# Patient Record
Sex: FEMALE | URBAN - METROPOLITAN AREA
[De-identification: names, ages, dates, MRNs, and addresses within clinical notes are randomized per-mention and may not be internally consistent; named-entity substitution may affect disease eponyms.]

---

## 2022-04-21 NOTE — PATIENT INSTRUCTIONS
Start Lexapro with a half a tablet for the first 6 days. Then go to 1 tablet daily. Xanax 3 times daily as needed. Take 1 especially before bed to help with sleep. If feels that is making too drowsy, take a half a tablet. Follow up in a few weeks - sooner if needed.

## 2022-04-21 NOTE — PROGRESS NOTES
HPI:    Patient ID: Christiano Gilbert is a 46year old female. HPI     Video visit done with patient to establish care. She is currently having problems due to witnessing the death of her boyfriend on Easter. She states that she is unable to sleep. Is having a hard time functioning. Denies being suicidal or homicidal.   States in general she has been basically healthy. Does not feel like she is really had doctors appointments since the birth of her daughter about 17 years ago. Currently she is not on any medications. She has had some antibiotics over the last several years due to kidney infections. Denies any chance of being pregnant. States she did start her menses today and he has a history of a bilateral tubal ligation. States she has been on Lexapro in the past.  She did well on the 10 mg, but felt more agitation when she went up to the 20 mg. Discussed medications with patient. She is willing to restart Lexapro at 5 mg for 6 days then go up to the 10 mg. She will also try Xanax to take especially 1 before bedtime. Her sister was present during the video visit. Sister did also clarify information. Review of Systems   Constitutional: Negative. HENT: Negative. Eyes: Negative. Respiratory: Negative. Cardiovascular: Negative. Genitourinary: Negative. Musculoskeletal: Negative. Skin: Negative. Neurological: Negative. Psychiatric/Behavioral:        See HPI            No current outpatient medications on file. Allergies: Ancef [Cefazolin]       HIVES   PHYSICAL EXAM:   There were no vitals filed for this visit. There is no height or weight on file to calculate BMI. Physical Exam  Constitutional:       Appearance: Normal appearance. Comments: Tearful during the visit. HENT:      Head: Normocephalic and atraumatic. Pulmonary:      Effort: Pulmonary effort is normal.   Neurological:      Mental Status: She is alert and oriented to person, place, and time. Psychiatric:         Attention and Perception: Attention and perception normal.         Mood and Affect: Mood is depressed. Speech: Speech normal.         Behavior: Behavior is cooperative. Thought Content: Thought content does not include homicidal or suicidal ideation. Thought content does not include homicidal or suicidal plan. Comments: Crying during the visit                ASSESSMENT/PLAN:   No diagnosis found. No orders of the defined types were placed in this encounter. Meds This Visit:  Requested Prescriptions      No prescriptions requested or ordered in this encounter       Imaging & Referrals:  None      There are no Patient Instructions on file for this visit.        WD#[id number]

## 2022-05-09 ENCOUNTER — TELEPHONE (OUTPATIENT)
Dept: FAMILY MEDICINE CLINIC | Facility: CLINIC | Age: 42
End: 2022-05-09

## 2022-05-11 RX ORDER — ALPRAZOLAM 0.5 MG/1
0.5 TABLET ORAL 3 TIMES DAILY PRN
Qty: 30 TABLET | Refills: 1 | Status: SHIPPED | OUTPATIENT
Start: 2022-05-11

## 2022-05-11 NOTE — TELEPHONE ENCOUNTER
Patient called back for condition update. She was tearful on the phone. She states she took 1/2 tablet of lexapro on the first day it was prescribed. She felt very anxious and did not take it again until 5/9/22. She started having nightmares. Not sleeping well. She asked for a refill of xanax.

## 2022-06-07 NOTE — TELEPHONE ENCOUNTER
Future appt:    Last Appointment with provider:   4/21/22 Telemed  [place] [facility] [place]  Visit date not found  No results found for: CHOLEST, HDL, LDL, TRIGLY, TRIG  No results found for: EAG, A1C  No results found for: T4F, TSH, TSHT4    No follow-ups on file.

## 2022-06-08 RX ORDER — ALPRAZOLAM 0.5 MG/1
TABLET ORAL
Qty: 30 TABLET | Refills: 0 | OUTPATIENT
Start: 2022-06-08

## 2022-06-08 NOTE — TELEPHONE ENCOUNTER
Spoke with pt. She does not need a refill at this time. Will contact office when she is due for refill.    See telephone note 5/9/22- Pt is having decreased nightmares and doing well on the medication

## 2022-07-05 RX ORDER — ALPRAZOLAM 0.5 MG/1
TABLET ORAL
Qty: 30 TABLET | Refills: 1 | Status: SHIPPED | OUTPATIENT
Start: 2022-07-05

## 2022-07-05 NOTE — TELEPHONE ENCOUNTER
Future appt:    Last Appointment with provider:  4/21/22 for anxiety, grieving. Last appointment at [facility] [place]:  Visit date not found  No results found for: CHOLEST, HDL, LDL, TRIGLY, TRIG  No results found for: EAG, A1C  No results found for: T4F, TSH, TSHT4    No follow-ups on file.

## 2022-07-18 NOTE — TELEPHONE ENCOUNTER
Future appt:    Last Appointment with provider:  4/21/22 telmed for grieving, anxiety. [place] [facility] [place]  Visit date not found  No results found for: CHOLEST, HDL, LDL, TRIGLY, TRIG  No results found for: EAG, A1C  No results found for: T4F, TSH, TSHT4    No follow-ups on file.

## 2022-07-20 RX ORDER — ESCITALOPRAM OXALATE 10 MG/1
TABLET ORAL
Qty: 30 TABLET | Refills: 0 | Status: SHIPPED | OUTPATIENT
Start: 2022-07-20

## 2022-07-20 NOTE — TELEPHONE ENCOUNTER
One refill done. Please let patient know that she should have a follow up appointment before more refills. Thank you.

## 2022-08-05 NOTE — TELEPHONE ENCOUNTER
Future appt:    Last Appointment with provider:   Visit date not found  Last appointment at [facility]:  Visit date not found  No results found for: CHOLEST, HDL, LDL, TRIGLY, TRIG  No results found for: EAG, A1C  No results found for: T4F, TSH, TSHT4    No follow-ups on file.

## 2022-08-09 RX ORDER — ALPRAZOLAM 0.5 MG/1
TABLET ORAL
Qty: 30 TABLET | Refills: 1 | OUTPATIENT
Start: 2022-08-09

## 2022-08-09 NOTE — TELEPHONE ENCOUNTER
Patient read [id number]  Has not scheduled follow up appointment.   Livier Cordova CMA, 08/09/22, 10:38 AM

## 2022-08-25 NOTE — PATIENT INSTRUCTIONS
Increase Lexapro to 20 mg    After a couple weeks of the increase, try decreasing Xanax at bedtime. Work to weaning off the Xanax. Follow-up as needed.

## 2022-08-26 RX ORDER — ALPRAZOLAM 0.5 MG/1
TABLET ORAL
Qty: 30 TABLET | Refills: 0 | Status: SHIPPED | OUTPATIENT
Start: 2022-08-26

## 2022-08-26 NOTE — TELEPHONE ENCOUNTER
Alprazolam: 7/5/22    Future appt:    Last Appointment with provider:     Telemedicine: 8/25/22 Kaylee    [place] [facility] [place]  Visit date not found  No results found for: CHOLEST, HDL, LDL, TRIGLY, TRIG  No results found for: EAG, A1C  No results found for: T4F, TSH, TSHT4    No follow-ups on file.

## 2022-09-27 RX ORDER — ALPRAZOLAM 0.5 MG/1
TABLET ORAL
Qty: 30 TABLET | Refills: 0 | Status: SHIPPED | OUTPATIENT
Start: 2022-09-27

## 2022-09-27 NOTE — TELEPHONE ENCOUNTER
Future appt:    Last Appointment with provider:  8/25/22 telemed for grieving, insomnia. Is to be working on weaning off xanax. Last appointment at [facility] [place]:  Visit date not found  No results found for: CHOLEST, HDL, LDL, TRIGLY, TRIG  No results found for: EAG, A1C  No results found for: T4F, TSH, TSHT4    No follow-ups on file.

## 2022-11-14 RX ORDER — ALPRAZOLAM 0.5 MG/1
0.5 TABLET ORAL 3 TIMES DAILY PRN
Qty: 30 TABLET | Refills: 0 | Status: SHIPPED | OUTPATIENT
Start: 2022-11-14

## 2022-11-14 NOTE — TELEPHONE ENCOUNTER
Future appt:    Last Appointment with provider:  8/25/22 telemed visit for medication follow up. Last appointment at [facility] [place]:  Visit date not found  No results found for: CHOLEST, HDL, LDL, TRIGLY, TRIG  No results found for: EAG, A1C  No results found for: T4F, TSH, TSHT4    No follow-ups on file.

## 2023-01-05 NOTE — TELEPHONE ENCOUNTER
LMOM to call and schedule physical.    Last refill 11/14/22 #30 w/0 refills    Future appt:    Last Appointment with provider:   Visit date not found  Last appointment at [facility]:  Visit date not found  No results found for: CHOLEST, HDL, LDL, TRIGLY, TRIG  No results found for: EAG, A1C  No results found for: T4F, TSH, TSHT4    No follow-ups on file.

## 2023-01-06 RX ORDER — ALPRAZOLAM 0.5 MG/1
0.5 TABLET ORAL 3 TIMES DAILY PRN
Qty: 30 TABLET | Refills: 0 | Status: SHIPPED | OUTPATIENT
Start: 2023-01-06

## 2023-01-06 NOTE — TELEPHONE ENCOUNTER
Future appt:    Last Appointment with provider:  8/25/22 telemed for grieving. Last appointment at [facility] [place]:  Visit date not found  No results found for: CHOLEST, HDL, LDL, TRIGLY, TRIG  No results found for: EAG, A1C  No results found for: T4F, TSH, TSHT4    No follow-ups on file.

## 2023-02-06 RX ORDER — ESCITALOPRAM OXALATE 20 MG/1
TABLET ORAL
Qty: 90 TABLET | Refills: 1 | Status: SHIPPED | OUTPATIENT
Start: 2023-02-06

## 2023-02-06 NOTE — TELEPHONE ENCOUNTER
Future appt:    Last Appointment with provider:   8/25/22 Telmed  [place] [facility] [place]  Visit date not found  No results found for: CHOLEST, HDL, LDL, TRIGLY, TRIG  No results found for: EAG, A1C  No results found for: T4F, TSH, TSHT4    No follow-ups on file.

## 2023-02-21 NOTE — TELEPHONE ENCOUNTER
Future appt:    Last Appointment with provider:  8/25/22 Telemed   Last appointment at [facility] [place]:  Visit date not found  No results found for: CHOLEST, HDL, LDL, TRIGLY, TRIG  No results found for: EAG, A1C  No results found for: T4F, TSH, TSHT4    No follow-ups on file.

## 2023-02-22 RX ORDER — ALPRAZOLAM 0.5 MG/1
0.5 TABLET ORAL 3 TIMES DAILY PRN
Qty: 30 TABLET | Refills: 0 | Status: SHIPPED
Start: 2023-02-22

## 2023-04-05 NOTE — TELEPHONE ENCOUNTER
Last Refill: 2/22/2023 #30 with 0 refills  Last Px: not on file  F/U Instructions: Per 4/21/2022 visit notes Follow up in a few weeks - sooner if needed. Future appt: Your appointments     Date & Time Appointment Department [facility])    Apr 07, 2023  2:45 PM CDT Follow Up Video Visit with Holly Hernandez, [place] (Edgardo Orosco)    Please verify your telehealth insurance benefits prior to your appointment. You must be in the state of [place] during the virtual visit. Please use the [employer] and launch the video visit 10 minutes prior to your scheduled appointment time to ensure your camera and microphone are working properly. Once the video visit has started you will be placed in a waiting room until the provider begins the visit. You will receive an email confirmation with instructions. If you have questions, call your doctor's office directly. If you are having issues or need to use a desktop/laptop, please follow the below steps:        1. Close out all other open apps (could be competing for audio resources)  2. Disable Bluetooth  3.       Reboot mobile device before joining the video  4. Come off Wi-Fi and switch over to Data    Please see our Video Visit Tip Sheet if you need additional assistance. If you believe this is an emergency, please dial 911 immediately. [id number] [facility] [address] [id number]  [phone]        Last Appointment with provider:   Visit date not found  [place] [facility] [place]:  Visit date not found  No results found for: CHOLEST, HDL, LDL, TRIGLY, TRIG  No results found for: EAG, A1C  No results found for: T4F, TSH, TSHT4    No follow-ups on file.

## 2023-04-05 NOTE — TELEPHONE ENCOUNTER
Future appt:    Last Appointment with provider:  8/25/22 Telemed, anxiety  [place] [facility] [place]:  Visit date not found  No results found for: CHOLEST, HDL, LDL, TRIGLY, TRIG  No results found for: EAG, A1C  No results found for: T4F, TSH, TSHT4    No follow-ups on file.

## 2023-04-06 RX ORDER — ALPRAZOLAM 0.5 MG/1
0.5 TABLET ORAL 3 TIMES DAILY PRN
Qty: 30 TABLET | Refills: 0 | Status: SHIPPED | OUTPATIENT
Start: 2023-04-06